# Patient Record
Sex: MALE | Race: BLACK OR AFRICAN AMERICAN | NOT HISPANIC OR LATINO | Employment: UNEMPLOYED | URBAN - METROPOLITAN AREA
[De-identification: names, ages, dates, MRNs, and addresses within clinical notes are randomized per-mention and may not be internally consistent; named-entity substitution may affect disease eponyms.]

---

## 2020-01-29 PROBLEM — F29 PSYCHOSIS: Status: ACTIVE | Noted: 2020-01-29

## 2020-02-10 PROBLEM — F20.9 SCHIZOPHRENIA: Status: ACTIVE | Noted: 2020-01-29

## 2020-02-12 ENCOUNTER — PATIENT OUTREACH (OUTPATIENT)
Dept: ADMINISTRATIVE | Facility: CLINIC | Age: 30
End: 2020-02-12

## 2020-02-12 NOTE — PATIENT INSTRUCTIONS
Schizophrenia (General Type)  Schizophrenia is a chronic, often disabling mental health disorder that makes functioning in work and society difficult. It is a type of psychosis, and involves perceiving reality differently from those around you. The difference been reality and what you think become blurred in your mind.  The cause of schizophrenia is not yet known. It is believed to be a result of genetic and biological factors (brain chemistry and structure). Schizophrenia does run in families and occurs in about 1% of the adult population. Environmental factors may also have a role in schizophrenia. These may include where you grew up, toxins, and infections.  Symptoms include:  · Hallucinations (seeing or hearing things that are not there)  · Delusions (false beliefs)  · Disorganized thinking and speech  · Social withdrawal  · Severe anxiety  · Feeling unreal  · Paranoia  · Insomnia  · Trouble thinking or concentrating clearly  · Depression, feeling suicidal  · Withdrawal from those around you  Medicines and therapy can help with many of the symptoms and allow for better daily function and quality of life. These medicines take 2 to 4 weeks to start working and 6 to 8 weeks to take full effect.  It is common to feel that you are not ill and that you don't need treatment. It is important to accept the support of friends and family in continuing to take your medicine.  Home care  · Ongoing care and support helps manage this disease. Find a healthcare provider and therapist who meet your needs. Seek help when you feel like your symptoms are getting worse.  · Tell each of your healthcare providers about all of the prescription medicines, over-the-counter medicines, and supplements you take. Certain supplements interact with medicines and can cause dangerous side effects. Ask your pharmacist when you have questions about drug interactions.  · Be sure to take all of your medicine as directed and get regular blood work  to check your medicine level and your overall health. Take the medicines and get the follow-up lab work as prescribed, even if you think you dont need it.  · Seek support from trusted friends or family by talking about your feelings and thoughts. Ask them to help you recognize behavior changes early so you can get help and medicines can be adjusted.  · If you are having trouble managing workplace issues, or caring for yourself because of your schizophrenia, contact your local Americans with Disabilities (ADA) office to see if they can help. The [place] operates a toll-free ADA information line at: 378.549.7142 (voice) or 246-465-2793 (TTY). They can help you locate a local office.  Follow-up care  Follow up with your doctor or therapist , or as advised.  Call 911  Call 911 if you:  · Have suicidal thoughts, a suicide plan, and the means to carry out the plan  · Have trouble breathing  · Are very confused  · Are very drowsy or have trouble awakening  · Feel faint or lose consciousness  · Have rapid heart rate, very low heart rate, or a new irregular heart rate  · Have a seizure  When to seek medical advice  Call your healthcare provider right away if any of these occur:  · Your symptoms are getting worse  · Family or friends express concern over your behavior and ask you to seek help  · Feeling out of control or that you are being controlled by others  · Feeling like you want to harm yourself or another  · Unable to care for yourself  · Worsening hallucinations (hearing voices)  · Hearing voices that are telling you to harm yourself or others  · Worsening depression or anxiety  Date Last Reviewed: 9/29/2015  © 1742-6183 [email or web address]. [address] [place] [address], [place] [address]. All rights reserved. This information is not intended as a substitute for professional medical care. Always follow your healthcare professional's instructions.

## 2020-04-08 PROBLEM — R03.0 ELEVATED BP WITHOUT DIAGNOSIS OF HYPERTENSION: Status: ACTIVE | Noted: 2020-04-08

## 2020-04-08 PROBLEM — Z13.9 ENCOUNTER FOR MEDICAL SCREENING EXAMINATION: Status: ACTIVE | Noted: 2020-04-08

## 2020-04-08 PROBLEM — R79.89 ELEVATED TSH: Status: ACTIVE | Noted: 2020-04-08

## 2020-04-08 PROBLEM — F29 PSYCHOSIS: Status: ACTIVE | Noted: 2020-04-08

## 2020-07-13 PROBLEM — Z13.9 ENCOUNTER FOR MEDICAL SCREENING EXAMINATION: Status: RESOLVED | Noted: 2020-04-08 | Resolved: 2020-07-13

## 2024-02-09 PROBLEM — F15.959 METHAMPHETAMINE-INDUCED PSYCHOTIC DISORDER: Status: ACTIVE | Noted: 2024-02-09
